# Patient Record
Sex: MALE | Race: WHITE | NOT HISPANIC OR LATINO
[De-identification: names, ages, dates, MRNs, and addresses within clinical notes are randomized per-mention and may not be internally consistent; named-entity substitution may affect disease eponyms.]

---

## 2019-09-05 PROBLEM — Z00.00 ENCOUNTER FOR PREVENTIVE HEALTH EXAMINATION: Status: ACTIVE | Noted: 2019-09-05

## 2019-10-01 ENCOUNTER — APPOINTMENT (OUTPATIENT)
Dept: INTERNAL MEDICINE | Facility: CLINIC | Age: 44
End: 2019-10-01
Payer: COMMERCIAL

## 2019-10-01 ENCOUNTER — NON-APPOINTMENT (OUTPATIENT)
Age: 44
End: 2019-10-01

## 2019-10-01 VITALS
HEART RATE: 82 BPM | DIASTOLIC BLOOD PRESSURE: 88 MMHG | WEIGHT: 172 LBS | HEIGHT: 74 IN | SYSTOLIC BLOOD PRESSURE: 128 MMHG | BODY MASS INDEX: 22.07 KG/M2 | TEMPERATURE: 99.5 F | OXYGEN SATURATION: 98 %

## 2019-10-01 DIAGNOSIS — Z91.5 PERSONAL HISTORY OF SELF-HARM: ICD-10-CM

## 2019-10-01 DIAGNOSIS — Z83.79 FAMILY HISTORY OF OTHER DISEASES OF THE DIGESTIVE SYSTEM: ICD-10-CM

## 2019-10-01 DIAGNOSIS — Z11.59 ENCOUNTER FOR SCREENING FOR OTHER VIRAL DISEASES: ICD-10-CM

## 2019-10-01 DIAGNOSIS — R09.82 POSTNASAL DRIP: ICD-10-CM

## 2019-10-01 DIAGNOSIS — Z23 ENCOUNTER FOR IMMUNIZATION: ICD-10-CM

## 2019-10-01 PROCEDURE — 90471 IMMUNIZATION ADMIN: CPT

## 2019-10-01 PROCEDURE — 99205 OFFICE O/P NEW HI 60 MIN: CPT | Mod: 25

## 2019-10-01 PROCEDURE — 90472 IMMUNIZATION ADMIN EACH ADD: CPT

## 2019-10-01 PROCEDURE — 90686 IIV4 VACC NO PRSV 0.5 ML IM: CPT

## 2019-10-01 PROCEDURE — 93000 ELECTROCARDIOGRAM COMPLETE: CPT

## 2019-10-01 PROCEDURE — 36415 COLL VENOUS BLD VENIPUNCTURE: CPT

## 2019-10-01 PROCEDURE — 90732 PPSV23 VACC 2 YRS+ SUBQ/IM: CPT

## 2019-10-01 RX ORDER — ALBUTEROL SULFATE 90 UG/1
108 (90 BASE) AEROSOL, METERED RESPIRATORY (INHALATION)
Qty: 1 | Refills: 2 | Status: ACTIVE | COMMUNITY
Start: 2019-10-01 | End: 1900-01-01

## 2019-10-17 ENCOUNTER — APPOINTMENT (OUTPATIENT)
Dept: PULMONOLOGY | Facility: CLINIC | Age: 44
End: 2019-10-17
Payer: COMMERCIAL

## 2019-10-17 VITALS
DIASTOLIC BLOOD PRESSURE: 72 MMHG | BODY MASS INDEX: 22.2 KG/M2 | HEIGHT: 74 IN | OXYGEN SATURATION: 95 % | WEIGHT: 173 LBS | TEMPERATURE: 97.8 F | HEART RATE: 86 BPM | SYSTOLIC BLOOD PRESSURE: 126 MMHG

## 2019-10-17 DIAGNOSIS — Z87.898 PERSONAL HISTORY OF OTHER SPECIFIED CONDITIONS: ICD-10-CM

## 2019-10-17 DIAGNOSIS — Z87.09 PERSONAL HISTORY OF OTHER DISEASES OF THE RESPIRATORY SYSTEM: ICD-10-CM

## 2019-10-17 DIAGNOSIS — Z77.098 CONTACT WITH AND (SUSPECTED) EXPOSURE TO OTHER HAZARDOUS, CHIEFLY NONMEDICINAL, CHEMICALS: ICD-10-CM

## 2019-10-17 PROCEDURE — 94060 EVALUATION OF WHEEZING: CPT

## 2019-10-17 PROCEDURE — 94727 GAS DIL/WSHOT DETER LNG VOL: CPT

## 2019-10-17 PROCEDURE — 99204 OFFICE O/P NEW MOD 45 MIN: CPT | Mod: 25

## 2019-10-17 PROCEDURE — 94729 DIFFUSING CAPACITY: CPT

## 2019-10-17 NOTE — REVIEW OF SYSTEMS
[Cough] : cough [Dyspnea] : dyspnea [Chest Tightness] : chest tightness [Wheezing] : wheezing [Depression] : depression [Negative] : Sleep Disorder [Sputum] : not coughing up ~M sputum [Hay Fever] : no hay fever [Itchy Eyes] : no itching of ~T the eyes [Hives] : no urticaria was observed

## 2019-10-17 NOTE — PHYSICAL EXAM
[General Appearance - Well Developed] : well developed [General Appearance - Well Nourished] : well nourished [Normal Conjunctiva] : the conjunctiva exhibited no abnormalities [Normal Oropharynx] : normal oropharynx [Neck Appearance] : the appearance of the neck was normal [Thyroid Diffuse Enlargement] : the thyroid was not enlarged [Heart Sounds] : normal S1 and S2 [Arterial Pulses Normal] : the arterial pulses were normal [Bowel Sounds] : normal bowel sounds [Abdomen Tenderness] : non-tender [] : no hepato-splenomegaly [Abnormal Walk] : normal gait [Nail Clubbing] : no clubbing of the fingernails [Cyanosis, Localized] : no localized cyanosis [1+ Pitting] : 1+  pitting [FreeTextEntry1] : Diffuse bilateral wheezing present [Deep Tendon Reflexes (DTR)] : deep tendon reflexes were 2+ and symmetric [Sensation] : the sensory exam was normal to light touch and pinprick [No Focal Deficits] : no focal deficits [Oriented To Time, Place, And Person] : oriented to person, place, and time [Impaired Insight] : insight and judgment were intact [Affect] : the affect was normal

## 2019-10-17 NOTE — ASSESSMENT
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 also possibility of hypersensitivity pneumonitis given exposure of multiple pets. patient's symptom chronology doesn't fit with COPD. \par \par PLAN: \par - start prednisone 20 mg x 14 days.\par - cont symbicort 160 mg 2 puffs twice a day, singulair 10 mg Qday\par - CT chest  (inspiratory and expiratory) to eval for mucous plugs/endobronchial lesions. \par - re-assess in 2 weeks. \par \par The patient was s/e/d with Dr Jackson.

## 2019-10-17 NOTE — HISTORY OF PRESENT ILLNESS
[FreeTextEntry1] : The patient is a 44 yr old male with\par \par breathing trouble for 4 years now. \par symptoms: difficulty breathing/wheezing. \par he hasn't been formally diagnosed with asthma. no hx of childhood asthma.  no family hx of asthma. 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 He had atleast 2 accidents with chemical spills (chlorine, diamtomacous earth (for filteration system).   \par \par About 8 months after swimming pool incident (4 yrs ago), had an acute exacerbation and was seen in urgent care and then hospitalized.\par  He was also in ICU in [facility] and [facility], was never intubated. \par no triggers identified.  difficult to lie to left side. \par last hospital admission was triggered by cold/viral infection. \par \par hx of alcoholism in 20s (24- 32 yrs age  about 3 drinks average daily). \par Smoked cig x 6 months (20 yrs ago)\par No vaping/smoking now.  \par \par also had a Hx of acute pancreatitis. \par \par currently using symbicort BID (2 months), ventolin 3 times daily.

## 2019-10-18 ENCOUNTER — RX RENEWAL (OUTPATIENT)
Age: 44
End: 2019-10-18

## 2019-10-18 RX ORDER — BUDESONIDE AND FORMOTEROL FUMARATE DIHYDRATE 160; 4.5 UG/1; UG/1
160-4.5 AEROSOL RESPIRATORY (INHALATION) TWICE DAILY
Qty: 1 | Refills: 0 | Status: DISCONTINUED | COMMUNITY
Start: 2019-10-17 | End: 2019-10-18

## 2019-10-19 LAB — TOTAL IGE SMQN RAST: 427 KU/L

## 2019-10-21 LAB
A FLAVUS AB FLD QL: NEGATIVE
A FUMIGATUS AB FLD QL: NEGATIVE
A NIGER AB FLD QL: NEGATIVE

## 2019-10-27 LAB
ASPERGILLUS FLAVUS PRECIPITINS: NEGATIVE
ASPERGILLUS FUMIGATES PRECIPTINS: NEGATIVE
ASPERGILLUS NIGER PRECIPITINS: NEGATIVE

## 2019-10-29 ENCOUNTER — INBOUND DOCUMENT (OUTPATIENT)
Age: 44
End: 2019-10-29

## 2019-10-29 ENCOUNTER — RX RENEWAL (OUTPATIENT)
Age: 44
End: 2019-10-29

## 2019-10-29 LAB
25(OH)D3 SERPL-MCNC: 19.5 NG/ML
A1AT SERPL-MCNC: 128 MG/DL
ALBUMIN SERPL ELPH-MCNC: 5.2 G/DL
ALP BLD-CCNC: 86 U/L
ALT SERPL-CCNC: 95 U/L
AMYLASE/CREAT SERPL: 79 U/L
ANION GAP SERPL CALC-SCNC: 14 MMOL/L
APPEARANCE: CLEAR
AST SERPL-CCNC: 61 U/L
BACTERIA: NEGATIVE
BASOPHILS # BLD AUTO: 0.03 K/UL
BASOPHILS NFR BLD AUTO: 0.5 %
BILIRUB SERPL-MCNC: 0.7 MG/DL
BILIRUBIN URINE: NEGATIVE
BLOOD URINE: NEGATIVE
BUN SERPL-MCNC: 15 MG/DL
CALCIUM SERPL-MCNC: 10.2 MG/DL
CHLORIDE SERPL-SCNC: 101 MMOL/L
CHOLEST SERPL-MCNC: 175 MG/DL
CHOLEST/HDLC SERPL: 3.1 RATIO
CO2 SERPL-SCNC: 27 MMOL/L
COLOR: YELLOW
CREAT SERPL-MCNC: 0.76 MG/DL
CREAT SPEC-SCNC: 180 MG/DL
EOSINOPHIL # BLD AUTO: 0.33 K/UL
EOSINOPHIL NFR BLD AUTO: 5.6 %
ESTIMATED AVERAGE GLUCOSE: 114 MG/DL
FOLATE SERPL-MCNC: 10 NG/ML
GLUCOSE QUALITATIVE U: NEGATIVE
GLUCOSE SERPL-MCNC: 127 MG/DL
HBA1C MFR BLD HPLC: 5.6 %
HCT VFR BLD CALC: 48.3 %
HDLC SERPL-MCNC: 56 MG/DL
HGB BLD-MCNC: 14.7 G/DL
HYALINE CASTS: 0 /LPF
IMM GRANULOCYTES NFR BLD AUTO: 0.2 %
KETONES URINE: NEGATIVE
LDLC SERPL CALC-MCNC: 105 MG/DL
LEUKOCYTE ESTERASE URINE: NEGATIVE
LPL SERPL-CCNC: 11 U/L
LYMPHOCYTES # BLD AUTO: 1.76 K/UL
LYMPHOCYTES NFR BLD AUTO: 29.8 %
MAN DIFF?: NORMAL
MCHC RBC-ENTMCNC: 29.2 PG
MCHC RBC-ENTMCNC: 30.4 GM/DL
MCV RBC AUTO: 96 FL
MEV IGG FLD QL IA: >300 AU/ML
MEV IGG+IGM SER-IMP: POSITIVE
MICROALBUMIN 24H UR DL<=1MG/L-MCNC: <1.2 MG/DL
MICROALBUMIN/CREAT 24H UR-RTO: NORMAL MG/G
MICROSCOPIC-UA: NORMAL
MONOCYTES # BLD AUTO: 0.7 K/UL
MONOCYTES NFR BLD AUTO: 11.8 %
MUV AB SER-ACNC: POSITIVE
MUV IGG SER QL IA: 11.1 AU/ML
NEUTROPHILS # BLD AUTO: 3.08 K/UL
NEUTROPHILS NFR BLD AUTO: 52.1 %
NITRITE URINE: NEGATIVE
PH URINE: 5
PLATELET # BLD AUTO: 190 K/UL
POTASSIUM SERPL-SCNC: 4.3 MMOL/L
PROT SERPL-MCNC: 7.4 G/DL
PROTEIN URINE: NEGATIVE
RBC # BLD: 5.03 M/UL
RBC # FLD: 13.8 %
RED BLOOD CELLS URINE: 1 /HPF
RUBV IGG FLD-ACNC: 5.4 INDEX
RUBV IGG SER-IMP: POSITIVE
SODIUM SERPL-SCNC: 142 MMOL/L
SPECIFIC GRAVITY URINE: 1.02
SQUAMOUS EPITHELIAL CELLS: 0 /HPF
TRIGL SERPL-MCNC: 70 MG/DL
TSH SERPL-ACNC: 1.64 UIU/ML
UROBILINOGEN URINE: NORMAL
VIT B12 SERPL-MCNC: 601 PG/ML
VZV AB TITR SER: POSITIVE
VZV IGG SER IF-ACNC: 691.7 INDEX
WBC # FLD AUTO: 5.91 K/UL
WHITE BLOOD CELLS URINE: 2 /HPF

## 2019-10-31 ENCOUNTER — APPOINTMENT (OUTPATIENT)
Dept: PULMONOLOGY | Facility: CLINIC | Age: 44
End: 2019-10-31
Payer: COMMERCIAL

## 2019-10-31 VITALS
BODY MASS INDEX: 22.46 KG/M2 | DIASTOLIC BLOOD PRESSURE: 70 MMHG | HEART RATE: 106 BPM | WEIGHT: 175 LBS | SYSTOLIC BLOOD PRESSURE: 118 MMHG | OXYGEN SATURATION: 96 % | HEIGHT: 74 IN | TEMPERATURE: 98.9 F

## 2019-10-31 PROCEDURE — 99214 OFFICE O/P EST MOD 30 MIN: CPT

## 2019-10-31 RX ORDER — FLUTICASONE FUROATE AND VILANTEROL TRIFENATATE 200; 25 UG/1; UG/1
200-25 POWDER RESPIRATORY (INHALATION) DAILY
Qty: 1 | Refills: 11 | Status: DISCONTINUED | COMMUNITY
Start: 2019-10-18 | End: 2019-10-31

## 2019-11-06 ENCOUNTER — TRANSCRIPTION ENCOUNTER (OUTPATIENT)
Age: 44
End: 2019-11-06

## 2019-11-09 RX ORDER — OMALIZUMAB 202.5 MG/1.4ML
150 INJECTION, SOLUTION SUBCUTANEOUS
Qty: 1 | Refills: 11 | Status: DISCONTINUED | COMMUNITY
Start: 2019-10-19 | End: 2019-11-09

## 2019-11-12 ENCOUNTER — RX RENEWAL (OUTPATIENT)
Age: 44
End: 2019-11-12

## 2019-11-13 ENCOUNTER — FORM ENCOUNTER (OUTPATIENT)
Age: 44
End: 2019-11-13

## 2019-11-14 ENCOUNTER — APPOINTMENT (OUTPATIENT)
Dept: CT IMAGING | Facility: HOSPITAL | Age: 44
End: 2019-11-14
Payer: COMMERCIAL

## 2019-11-14 ENCOUNTER — LABORATORY RESULT (OUTPATIENT)
Age: 44
End: 2019-11-14

## 2019-11-14 ENCOUNTER — APPOINTMENT (OUTPATIENT)
Dept: ULTRASOUND IMAGING | Facility: HOSPITAL | Age: 44
End: 2019-11-14
Payer: COMMERCIAL

## 2019-11-14 ENCOUNTER — APPOINTMENT (OUTPATIENT)
Dept: PULMONOLOGY | Facility: CLINIC | Age: 44
End: 2019-11-14
Payer: COMMERCIAL

## 2019-11-14 ENCOUNTER — OUTPATIENT (OUTPATIENT)
Dept: OUTPATIENT SERVICES | Facility: HOSPITAL | Age: 44
LOS: 1 days | End: 2019-11-14
Payer: COMMERCIAL

## 2019-11-14 PROCEDURE — 76700 US EXAM ABDOM COMPLETE: CPT

## 2019-11-14 PROCEDURE — 71250 CT THORAX DX C-: CPT | Mod: 26

## 2019-11-14 PROCEDURE — 36415 COLL VENOUS BLD VENIPUNCTURE: CPT

## 2019-11-14 PROCEDURE — 76700 US EXAM ABDOM COMPLETE: CPT | Mod: 26

## 2019-11-14 PROCEDURE — 71250 CT THORAX DX C-: CPT

## 2019-11-18 ENCOUNTER — APPOINTMENT (OUTPATIENT)
Dept: INTERNAL MEDICINE | Facility: CLINIC | Age: 44
End: 2019-11-18
Payer: COMMERCIAL

## 2019-11-18 ENCOUNTER — APPOINTMENT (OUTPATIENT)
Dept: UROLOGY | Facility: CLINIC | Age: 44
End: 2019-11-18
Payer: COMMERCIAL

## 2019-11-18 VITALS
BODY MASS INDEX: 22.84 KG/M2 | HEART RATE: 98 BPM | TEMPERATURE: 99.2 F | DIASTOLIC BLOOD PRESSURE: 79 MMHG | WEIGHT: 178 LBS | SYSTOLIC BLOOD PRESSURE: 119 MMHG | OXYGEN SATURATION: 98 % | HEIGHT: 74 IN

## 2019-11-18 DIAGNOSIS — Z87.19 PERSONAL HISTORY OF OTHER DISEASES OF THE DIGESTIVE SYSTEM: ICD-10-CM

## 2019-11-18 DIAGNOSIS — R39.9 UNSPECIFIED SYMPTOMS AND SIGNS INVOLVING THE GENITOURINARY SYSTEM: ICD-10-CM

## 2019-11-18 DIAGNOSIS — R30.0 DYSURIA: ICD-10-CM

## 2019-11-18 DIAGNOSIS — Z78.9 OTHER SPECIFIED HEALTH STATUS: ICD-10-CM

## 2019-11-18 DIAGNOSIS — K59.09 OTHER CONSTIPATION: ICD-10-CM

## 2019-11-18 LAB
BILIRUB UR QL STRIP: NORMAL
CFTR MUT ANL BLD/T: NORMAL
CLARITY UR: NORMAL
COLLECTION METHOD: NORMAL
GLUCOSE UR-MCNC: NORMAL
HCG UR QL: 0.2 EU/DL
HGB UR QL STRIP.AUTO: NORMAL
KETONES UR-MCNC: NORMAL
LEUKOCYTE ESTERASE UR QL STRIP: NORMAL
NITRITE UR QL STRIP: NORMAL
PH UR STRIP: 5
PROT UR STRIP-MCNC: NORMAL
SP GR UR STRIP: 1.03

## 2019-11-18 PROCEDURE — 81003 URINALYSIS AUTO W/O SCOPE: CPT | Mod: QW

## 2019-11-18 PROCEDURE — 99202 OFFICE O/P NEW SF 15 MIN: CPT | Mod: 25

## 2019-11-18 PROCEDURE — 99214 OFFICE O/P EST MOD 30 MIN: CPT

## 2019-11-18 PROCEDURE — 51741 ELECTRO-UROFLOWMETRY FIRST: CPT

## 2019-11-18 PROCEDURE — 51798 US URINE CAPACITY MEASURE: CPT | Mod: 59

## 2019-11-18 RX ORDER — PREDNISONE 5 MG/1
5 TABLET ORAL
Qty: 9 | Refills: 0 | Status: DISCONTINUED | COMMUNITY
Start: 2019-10-31 | End: 2019-11-18

## 2019-11-18 RX ORDER — ALBUTEROL 90 MCG
90 AEROSOL (GRAM) INHALATION
Refills: 0 | Status: COMPLETED | COMMUNITY

## 2019-11-18 RX ORDER — PREDNISONE 20 MG/1
20 TABLET ORAL
Qty: 14 | Refills: 0 | Status: DISCONTINUED | COMMUNITY
Start: 2019-10-17 | End: 2019-11-18

## 2019-11-20 PROBLEM — Z87.19 HISTORY OF ACUTE PANCREATITIS: Status: RESOLVED | Noted: 2019-10-01 | Resolved: 2019-11-20

## 2019-12-02 ENCOUNTER — APPOINTMENT (OUTPATIENT)
Dept: UROLOGY | Facility: CLINIC | Age: 44
End: 2019-12-02

## 2019-12-10 NOTE — ASSESSMENT
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 will taper over next 1 week.\par - considering steroid dependent nature and severe persistent asthma with elevated IgE, patient will benefit from Dupixent if approved by insurance and decrease hospitalization frequency.. will contact patient once it is approved.  \par - cont high dose fluticasone/salmetrol combo in the meanwhile. \par \par The patient was s/e/d with Dr Jackson.

## 2019-12-10 NOTE — PHYSICAL EXAM
[General Appearance - Well Developed] : well developed [General Appearance - Well Nourished] : well nourished [Normal Conjunctiva] : the conjunctiva exhibited no abnormalities [Normal Oropharynx] : normal oropharynx [Neck Appearance] : the appearance of the neck was normal [Thyroid Diffuse Enlargement] : the thyroid was not enlarged [Heart Sounds] : normal S1 and S2 [Arterial Pulses Normal] : the arterial pulses were normal [Bowel Sounds] : normal bowel sounds [Abdomen Tenderness] : non-tender [] : no hepato-splenomegaly [Abnormal Walk] : normal gait [Nail Clubbing] : no clubbing of the fingernails [Cyanosis, Localized] : no localized cyanosis [1+ Pitting] : 1+  pitting [Deep Tendon Reflexes (DTR)] : deep tendon reflexes were 2+ and symmetric [Sensation] : the sensory exam was normal to light touch and pinprick [No Focal Deficits] : no focal deficits [Oriented To Time, Place, And Person] : oriented to person, place, and time [Impaired Insight] : insight and judgment were intact [Affect] : the affect was normal [FreeTextEntry1] : Diffuse bilateral wheezing present

## 2019-12-10 NOTE — HISTORY OF PRESENT ILLNESS
[1  - Very slight] : 1, very slight [Class II - Mild Symptoms and Slight Limitations] : II [Former] : is a former smoker [None] : None [Adherent] : the patient is adherent with ~his/her~ medication regimen [Difficulty Breathing During Exertion] : improved dyspnea on exertion [Feelings Of Weakness On Exertion] : improved exercise intolerance [Cough] : improved coughing [Wheezing] : denies wheezing [Regional Soft Tissue Swelling Both Lower Extremities] : denies lower extremity edema [Chest Pain Or Discomfort] : denies chest pain [Fever] : denies fever [Wt Gain ___ Lbs] : no recent weight gain [Oxygen] : the patient uses no supplemental oxygen [More Frequent Use Needed Recently] : Patient reports no recent increase in frequency of [Good Control] : peak flow has been poor [Side Effects] : ~He/She~ denies medication side effects [FreeTextEntry1] : The patient is a 44 yr old male with\par \par breathing trouble for 4 years now. \par symptoms: difficulty breathing/wheezing. \par he hasn't been formally diagnosed with asthma. no hx of childhood asthma.  no family hx of asthma. 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 He had atleast 2 accidents with chemical spills (chlorine, diamtomacous earth (for filteration system).   \par \par About 8 months after swimming pool incident (4 yrs ago), had an acute exacerbation and was seen in urgent care and then hospitalized.\par  He was also in ICU in [facility], was never intubated. \par no triggers identified.  difficult to lie to left side. \par last hospital admission was triggered by cold/viral infection. \par \par hx of alcoholism in 20s (24- 32 yrs age  about 3 drinks average daily). \par Smoked cig x 6 months (20 yrs ago)\par No vaping/smoking now.  \par \par also had a Hx of acute pancreatitis. \par currently using symbicort BID (2 months), ventolin 3 times daily.\par \par 10/31/2019: \par \par Since starting prednisone 20 mg 2 weeks ago, patient is feeling much better, breathing is easier now on wixela 500/50 one puff twice a day.

## 2020-03-17 ENCOUNTER — TRANSCRIPTION ENCOUNTER (OUTPATIENT)
Age: 45
End: 2020-03-17

## 2020-03-19 ENCOUNTER — TRANSCRIPTION ENCOUNTER (OUTPATIENT)
Age: 45
End: 2020-03-19

## 2020-05-13 ENCOUNTER — APPOINTMENT (OUTPATIENT)
Dept: INTERNAL MEDICINE | Facility: CLINIC | Age: 45
End: 2020-05-13
Payer: MEDICAID

## 2020-05-13 DIAGNOSIS — K21.9 GASTRO-ESOPHAGEAL REFLUX DISEASE W/OUT ESOPHAGITIS: ICD-10-CM

## 2020-05-13 PROCEDURE — 99441: CPT

## 2020-05-16 PROBLEM — K21.9 CHRONIC GERD: Status: ACTIVE | Noted: 2019-10-01

## 2020-05-16 NOTE — HISTORY OF PRESENT ILLNESS
[FreeTextEntry8] : When started lansoprazole in the fall, symptoms improved so he didn't schedule with GI.\par \par Having worsening heartburn, but no changes in diet

## 2020-05-18 ENCOUNTER — TRANSCRIPTION ENCOUNTER (OUTPATIENT)
Age: 45
End: 2020-05-18

## 2020-05-18 RX ORDER — LANSOPRAZOLE 30 MG/1
30 CAPSULE, DELAYED RELEASE ORAL DAILY
Qty: 90 | Refills: 1 | Status: COMPLETED | COMMUNITY
Start: 2019-10-01 | End: 2020-05-18

## 2020-05-18 RX ORDER — DEXLANSOPRAZOLE 60 MG/1
60 CAPSULE, DELAYED RELEASE ORAL
Qty: 90 | Refills: 0 | Status: DISCONTINUED | COMMUNITY
Start: 2020-05-13 | End: 2020-05-18

## 2020-09-13 ENCOUNTER — EMERGENCY (EMERGENCY)
Age: 45
LOS: 1 days | Discharge: HOME | End: 2020-09-13
Attending: EMERGENCY MEDICINE | Admitting: EMERGENCY MEDICINE
Payer: COMMERCIAL

## 2020-09-13 VITALS
SYSTOLIC BLOOD PRESSURE: 131 MMHG | OXYGEN SATURATION: 98 % | HEART RATE: 100 BPM | DIASTOLIC BLOOD PRESSURE: 95 MMHG | RESPIRATION RATE: 20 BRPM | TEMPERATURE: 98 F

## 2020-09-13 VITALS
WEIGHT: 190.04 LBS | RESPIRATION RATE: 20 BRPM | TEMPERATURE: 98 F | OXYGEN SATURATION: 99 % | HEART RATE: 122 BPM | SYSTOLIC BLOOD PRESSURE: 125 MMHG | DIASTOLIC BLOOD PRESSURE: 93 MMHG

## 2020-09-13 DIAGNOSIS — Z20.828 CONTACT WITH AND (SUSPECTED) EXPOSURE TO OTHER VIRAL COMMUNICABLE DISEASES: ICD-10-CM

## 2020-09-13 DIAGNOSIS — R06.02 SHORTNESS OF BREATH: ICD-10-CM

## 2020-09-13 DIAGNOSIS — R04.0 EPISTAXIS: ICD-10-CM

## 2020-09-13 DIAGNOSIS — R53.1 WEAKNESS: ICD-10-CM

## 2020-09-13 DIAGNOSIS — K85.90 ACUTE PANCREATITIS WITHOUT NECROSIS OR INFECTION, UNSPECIFIED: ICD-10-CM

## 2020-09-13 LAB — LACTATE SERPL-SCNC: 1.9 MMOL/L — SIGNIFICANT CHANGE UP (ref 0.5–2)

## 2020-09-13 PROCEDURE — 71046 X-RAY EXAM CHEST 2 VIEWS: CPT | Mod: 26

## 2020-09-13 PROCEDURE — 99285 EMERGENCY DEPT VISIT HI MDM: CPT

## 2020-09-13 RX ORDER — SODIUM CHLORIDE 9 MG/ML
1000 INJECTION INTRAMUSCULAR; INTRAVENOUS; SUBCUTANEOUS ONCE
Refills: 0 | Status: COMPLETED | OUTPATIENT
Start: 2020-09-13 | End: 2020-09-13

## 2020-09-13 RX ORDER — FAMOTIDINE 10 MG/ML
20 INJECTION INTRAVENOUS ONCE
Refills: 0 | Status: COMPLETED | OUTPATIENT
Start: 2020-09-13 | End: 2020-09-13

## 2020-09-13 RX ORDER — SODIUM CHLORIDE 9 MG/ML
1000 INJECTION, SOLUTION INTRAVENOUS ONCE
Refills: 0 | Status: COMPLETED | OUTPATIENT
Start: 2020-09-13 | End: 2020-09-13

## 2020-09-13 RX ORDER — HYDROMORPHONE HYDROCHLORIDE 2 MG/ML
1 INJECTION INTRAMUSCULAR; INTRAVENOUS; SUBCUTANEOUS ONCE
Refills: 0 | Status: DISCONTINUED | OUTPATIENT
Start: 2020-09-13 | End: 2020-09-13

## 2020-09-13 NOTE — ED PROVIDER NOTE - NSFOLLOWUPINSTRUCTIONS_ED_ALL_ED_FT
eat bland diet, avoid spicy / fried foods, avoid alcohol, take pepcid, follow up with gi doctor  [place] Diet  A bland diet consists of foods that are often soft and do not have a lot of fat, fiber, or extra seasonings. Foods without fat, fiber, or seasoning are easier for the body to digest. They are also less likely to irritate your mouth, throat, stomach, and other parts of your digestive system. A bland diet is sometimes called a BRAT diet.  What is my plan?  Your health care provider or food and nutrition specialist (dietitian) may recommend specific changes to your diet to prevent symptoms or to treat your symptoms. These changes may include:  •Eating small meals often.  •Cooking food until it is soft enough to chew easily.  •Chewing your food well.  •Drinking fluids slowly.  •Not eating foods that are very spicy, sour, or fatty.  •Not eating citrus fruits, such as oranges and grapefruit.  What do I need to know about this diet?  •Eat a variety of foods from the bland diet food list.  • Do not follow a bland diet longer than needed.  •Ask your health care provider whether you should take vitamins or supplements.  What foods can I eat?  Grains   Hot cereals, such as cream of wheat. Rice. Bread, crackers, or tortillas made from refined white flour.  Vegetables   Canned or cooked vegetables. Mashed or boiled potatoes.  Fruits   Bananas. Applesauce. Other types of cooked or canned fruit with the skin and seeds removed, such as canned peaches or pears.  Meats and other proteins   Scrambled eggs. Creamy peanut butter or other nut butters. Lean, well-cooked meats, such as chicken or fish. Tofu. Soups or broths.  Dairy   Low-fat dairy products, such as milk, cottage cheese, or yogurt.  Beverages   Water. Herbal tea. Apple juice.  Fats and oils   Mild salad dressings. Canola or olive oil.  Sweets and desserts   Pudding. Custard. Fruit gelatin. Ice cream.  The items listed above may not be a complete list of recommended foods and beverages. Contact a dietitian for more options.   What foods are not recommended?  Grains   Whole grain breads and cereals.  Vegetables   Raw vegetables.  Fruits   Raw fruits, especially citrus, berries, or dried fruits.  Dairy   Whole fat dairy foods.  Beverages   Caffeinated drinks. Alcohol.  Seasonings and condiments   Strongly flavored seasonings or condiments. Hot sauce. Salsa.  Other foods   Spicy foods. Fried foods. Sour foods, such as pickled or fermented foods. Foods with high sugar content. Foods high in fiber.  The items listed above may not be a complete list of foods and beverages to avoid. Contact a dietitian for more information.   Summary  •A bland diet consists of foods that are often soft and do not have a lot of fat, fiber, or extra seasonings.  •Foods without fat, fiber, or seasoning are easier for the body to digest.  •Check with your health care provider to see how long you should follow this diet plan. It is not meant to be followed for long periods.

## 2020-09-13 NOTE — ED PROVIDER NOTE - PATIENT PORTAL LINK FT
You can access the FollowMyHealth Patient Portal offered by [facility] by registering at the following website: [email or web address]. By joining [employer]’s FollowMyHealth portal, you will also be able to view your health information using other applications (apps) compatible with our system.

## 2020-09-13 NOTE — ED ADULT NURSE NOTE - CHPI ED NUR SYMPTOMS NEG
no dysuria/no burning urination/no chills/no blood in stool/no fever/no hematuria/no abdominal distension/no vomiting

## 2020-09-13 NOTE — ED PROVIDER NOTE - SHIFT CHANGE DETAILS
45M recurrent pancreatitis from reportedly ?reason, "dilaudid withdrawals," c/o epigastric abd pain. labs wnl. s/p dilaudid. stable.    dispo: pending ct, po challenge, anticipate dc home 45M prior acute pancreatitis from reportedly ?reason, "dilaudid withdrawals," c/o epigastric abd pain. labs wnl, including lipase. s/p ativan/dilaudid. stable.    dispo: pending ct, po challenge, anticipate dc home

## 2020-09-13 NOTE — ED ADULT TRIAGE NOTE - CHIEF COMPLAINT QUOTE
pt BIBA from [facility] with upper abdominal pain , weakness, pale and diaphoretic , tachycardic , hx pancreatitis /anemia

## 2020-09-13 NOTE — ED ADULT NURSE NOTE - CHIEF COMPLAINT QUOTE
pt BIBA from [facility] with upper abdominal pain , weakness, pale and diaphoretic , tachycardic , hx pancreatitis /anemia

## 2020-09-13 NOTE — ED PROVIDER NOTE - PHYSICAL EXAMINATION

## 2020-09-13 NOTE — ED PROVIDER NOTE - OBJECTIVE STATEMENT
44 yo hx of pancreatitis, unspecified lung allergic reaction presents with abd pain, shortness of breath for two days, states does not know what triggered it this time. no recent etoh/fat food intake. has had withdrawals from dilaudid in the past. admissions to [facility] in the past. 46 yo hx of pancreatitis w 9 prior admissions at OSH, most at [facility], presents with abd pain, shortness of breath for two days, states does not know what triggered epi pain this time. no recent etoh/fat food intake. has had withdrawals from dilaudid in the past. Denies associated NVD, lightheaded,  palpitations, cough/rhinorrhea, black/bloody stool, LE pain/swelling, focal weakness/numbness, urinary complaints, f/c. No SI/HI currently although states prior lomeli

## 2020-09-13 NOTE — ED ADULT NURSE NOTE - NS ED NURSE LEVEL OF CONSCIOUSNESS MENTAL STATUS
-- DO NOT REPLY / DO NOT REPLY ALL --  -- Message is from the [facility]--    COVID-19 Universal Screening: N/A - Not about scheduling    General Patient Message      Reason for Call: Patient had video call with neurosurgeon and stated that he would like the patient to take mobic or stellabec, and the patient noted his elevated creatine level and voltaren for it and the neurosurgeon stated that it could be absorbed into his system. The patient would like to discuss this with Dr. Mahoney.     Caller Information       Type Contact Phone    08/24/2020 11:25 AM CDT Phone (Incoming) Teddy Kamara (Self) [phone] (M)          Alternative phone number: none    Turnaround time given to caller:   \"This message will be sent to [state Provider's name]. The clinical team will fulfill your request as soon as they review your message.\"    
Alert/Awake/Cooperative

## 2020-09-13 NOTE — ED ADULT NURSE NOTE - OBJECTIVE STATEMENT
46 yo m pmhx pancreatitis, depression presents to the ED co weakness, nausea, heart palpitations, 2 episodes of diarrhea and diaphoresis since yesterday. Pt denies use of medication for pain relief/ nausea. Upon arrival pt AOx3, speaking clearly and coherently in full sentences. Pt describes baseline 4/10 pain, and epigastric area and bilateral upper quadrants are tender to palpation. Pt noted to be diaphoretic, EKG completed and is afebrile. Last able to tolerate PO protein shake this AM followed by discomfort. Denies fevers, chills, headache, dizziness, vomiting. Denies use of alcohol.

## 2020-09-13 NOTE — ED ADULT NURSE REASSESSMENT NOTE - NS ED NURSE REASSESS COMMENT FT1
Pt resting in stretcher, more comfortable appearing than upon arrival. Pt reports 4/10 pain at rest and increased pain with movement.

## 2020-09-13 NOTE — ED PROVIDER NOTE - PROGRESS NOTE DETAILS
ED ct down. still awaiting ct upstairs. ct tech states pt should go next. pt stable. conchis: pt received at sign out from dr alicea as pending ct abd to r/o pancreatitis, well appearing, nad, requesting dilaudid repeatedly but nad, behavior highly concerning for drug seeking, no further pain meds given, ct neg, lipase neg, bland diet and gi f/u encouraged - offered gi referral but refused

## 2020-09-13 NOTE — ED PROVIDER NOTE - CLINICAL SUMMARY MEDICAL DECISION MAKING FREE TEXT BOX
Pt w concern for epigastric pain Pt w concern for epigastric pain and acute pancreatitis, plan for labs, CT given last pancreatitis flare three years ago. LR bolus, pt somewhat anxious- ativan given. Likely admission for pain control, bowel rest and cont'd hydration. PMD Fallon Garcia, no GI follow up.

## 2020-09-14 LAB — SARS-COV-2 RNA SPEC QL NAA+PROBE: SIGNIFICANT CHANGE UP

## 2020-09-14 PROCEDURE — 96374 THER/PROPH/DIAG INJ IV PUSH: CPT | Mod: XU

## 2020-09-14 PROCEDURE — 83605 ASSAY OF LACTIC ACID: CPT

## 2020-09-14 PROCEDURE — 74177 CT ABD & PELVIS W/CONTRAST: CPT

## 2020-09-14 PROCEDURE — 86850 RBC ANTIBODY SCREEN: CPT

## 2020-09-14 PROCEDURE — 86901 BLOOD TYPING SEROLOGIC RH(D): CPT

## 2020-09-14 PROCEDURE — 71046 X-RAY EXAM CHEST 2 VIEWS: CPT

## 2020-09-14 PROCEDURE — 86900 BLOOD TYPING SEROLOGIC ABO: CPT

## 2020-09-14 PROCEDURE — 96361 HYDRATE IV INFUSION ADD-ON: CPT

## 2020-09-14 PROCEDURE — 83690 ASSAY OF LIPASE: CPT

## 2020-09-14 PROCEDURE — 74177 CT ABD & PELVIS W/CONTRAST: CPT | Mod: 26

## 2020-09-14 PROCEDURE — 85730 THROMBOPLASTIN TIME PARTIAL: CPT

## 2020-09-14 PROCEDURE — U0003: CPT

## 2020-09-14 PROCEDURE — 85025 COMPLETE CBC W/AUTO DIFF WBC: CPT

## 2020-09-14 PROCEDURE — 80053 COMPREHEN METABOLIC PANEL: CPT

## 2020-09-14 PROCEDURE — 36415 COLL VENOUS BLD VENIPUNCTURE: CPT

## 2020-09-14 PROCEDURE — 99284 EMERGENCY DEPT VISIT MOD MDM: CPT | Mod: 25

## 2020-09-14 PROCEDURE — 96375 TX/PRO/DX INJ NEW DRUG ADDON: CPT

## 2020-09-14 PROCEDURE — 85610 PROTHROMBIN TIME: CPT

## 2020-10-09 ENCOUNTER — APPOINTMENT (OUTPATIENT)
Dept: GASTROENTEROLOGY | Facility: CLINIC | Age: 45
End: 2020-10-09

## 2020-10-19 ENCOUNTER — RX RENEWAL (OUTPATIENT)
Age: 45
End: 2020-10-19

## 2020-11-14 ENCOUNTER — RX RENEWAL (OUTPATIENT)
Age: 45
End: 2020-11-14

## 2020-11-17 ENCOUNTER — TRANSCRIPTION ENCOUNTER (OUTPATIENT)
Age: 45
End: 2020-11-17

## 2021-03-08 ENCOUNTER — RX RENEWAL (OUTPATIENT)
Age: 46
End: 2021-03-08

## 2021-04-01 ENCOUNTER — APPOINTMENT (OUTPATIENT)
Dept: INTERNAL MEDICINE | Facility: CLINIC | Age: 46
End: 2021-04-01
Payer: MEDICAID

## 2021-04-01 DIAGNOSIS — R11.10 VOMITING, UNSPECIFIED: ICD-10-CM

## 2021-04-01 PROCEDURE — 99441: CPT

## 2021-04-01 RX ORDER — ONDANSETRON 4 MG/1
4 TABLET, ORALLY DISINTEGRATING ORAL EVERY 6 HOURS
Qty: 28 | Refills: 0 | Status: ACTIVE | COMMUNITY
Start: 2021-04-01 | End: 1900-01-01

## 2021-04-05 PROBLEM — R11.10 VOMITING: Status: ACTIVE | Noted: 2021-04-01

## 2021-05-13 ENCOUNTER — APPOINTMENT (OUTPATIENT)
Dept: PULMONOLOGY | Facility: CLINIC | Age: 46
End: 2021-05-13
Payer: MEDICAID

## 2021-05-13 VITALS
HEART RATE: 90 BPM | TEMPERATURE: 97.8 F | BODY MASS INDEX: 24.77 KG/M2 | SYSTOLIC BLOOD PRESSURE: 147 MMHG | WEIGHT: 193 LBS | HEIGHT: 74 IN | DIASTOLIC BLOOD PRESSURE: 78 MMHG | OXYGEN SATURATION: 97 %

## 2021-05-13 DIAGNOSIS — R91.8 OTHER NONSPECIFIC ABNORMAL FINDING OF LUNG FIELD: ICD-10-CM

## 2021-05-13 PROCEDURE — 99072 ADDL SUPL MATRL&STAF TM PHE: CPT

## 2021-05-13 PROCEDURE — 99214 OFFICE O/P EST MOD 30 MIN: CPT

## 2021-05-13 RX ORDER — SUCRALFATE 1 G/1
1 TABLET ORAL 3 TIMES DAILY
Qty: 30 | Refills: 0 | Status: DISCONTINUED | COMMUNITY
Start: 2020-05-13 | End: 2021-05-13

## 2021-05-13 RX ORDER — FLUTICASONE PROPIONATE 50 UG/1
50 SPRAY, METERED NASAL
Qty: 1 | Refills: 11 | Status: ACTIVE | COMMUNITY
Start: 2019-10-01 | End: 1900-01-01

## 2021-05-13 RX ORDER — MONTELUKAST 10 MG/1
10 TABLET, FILM COATED ORAL
Qty: 90 | Refills: 3 | Status: DISCONTINUED | COMMUNITY
Start: 2019-10-17 | End: 2021-05-13

## 2021-05-13 NOTE — ASSESSMENT
[FreeTextEntry1] : Bronchial asthma\par \par The patient is clinically stable.  The patient since he has been on Dupixent has not used the albuterol at all.  AST score is 25/25.\par \par The patient 1 month missed the Dupixent For 1 month and his condition exacerbated and required short acting beta agonist several times a day.  At this point the patient is to continue on the current regimen.  I will decrease the Advair to 250/51 puff twice a day as the patient is clinically stable.  The patient tolerated discontinuing Singulair.\par \par The patient never required neither emergency room nor urgent care no systemic steroids since the last time I saw him.\par \par Patient will update me on his condition regarding changing his regimen.\par \par Pulmonary nodules\par \par The patient had 5 mm nodule and will repeat the CAT scan to confirm more than 2-year stability

## 2021-05-13 NOTE — HISTORY OF PRESENT ILLNESS
[Never] : never [TextBox_4] : Is doing very well since he was on the biological.  He went to [place] and missed the dose for 1 month and his condition exacerbated and was using the albuterol more than once a day until he started taking it and that now he has not been using the albuterol at all and his exercise capacity is stable.

## 2021-06-03 ENCOUNTER — NON-APPOINTMENT (OUTPATIENT)
Age: 46
End: 2021-06-03

## 2021-07-13 ENCOUNTER — RESULT REVIEW (OUTPATIENT)
Age: 46
End: 2021-07-13

## 2021-07-13 ENCOUNTER — OUTPATIENT (OUTPATIENT)
Dept: OUTPATIENT SERVICES | Facility: HOSPITAL | Age: 46
LOS: 1 days | End: 2021-07-13
Payer: COMMERCIAL

## 2021-07-13 ENCOUNTER — APPOINTMENT (OUTPATIENT)
Dept: CT IMAGING | Facility: HOSPITAL | Age: 46
End: 2021-07-13
Payer: COMMERCIAL

## 2021-07-13 PROCEDURE — 71250 CT THORAX DX C-: CPT | Mod: 26

## 2021-07-13 PROCEDURE — 71250 CT THORAX DX C-: CPT

## 2021-07-27 ENCOUNTER — NON-APPOINTMENT (OUTPATIENT)
Age: 46
End: 2021-07-27

## 2021-09-01 ENCOUNTER — RX RENEWAL (OUTPATIENT)
Age: 46
End: 2021-09-01

## 2021-09-01 ENCOUNTER — TRANSCRIPTION ENCOUNTER (OUTPATIENT)
Age: 46
End: 2021-09-01

## 2021-10-08 ENCOUNTER — APPOINTMENT (OUTPATIENT)
Dept: GASTROENTEROLOGY | Facility: CLINIC | Age: 46
End: 2021-10-08
Payer: MEDICAID

## 2021-10-08 VITALS
HEIGHT: 74 IN | BODY MASS INDEX: 24.38 KG/M2 | DIASTOLIC BLOOD PRESSURE: 80 MMHG | SYSTOLIC BLOOD PRESSURE: 110 MMHG | RESPIRATION RATE: 14 BRPM | TEMPERATURE: 97.2 F | WEIGHT: 190 LBS | HEART RATE: 79 BPM | OXYGEN SATURATION: 98 %

## 2021-10-08 DIAGNOSIS — R19.7 DIARRHEA, UNSPECIFIED: ICD-10-CM

## 2021-10-08 DIAGNOSIS — R10.9 UNSPECIFIED ABDOMINAL PAIN: ICD-10-CM

## 2021-10-08 DIAGNOSIS — K92.0 HEMATEMESIS: ICD-10-CM

## 2021-10-08 PROCEDURE — 99203 OFFICE O/P NEW LOW 30 MIN: CPT

## 2021-10-08 RX ORDER — FLUTICASONE PROPIONATE AND SALMETEROL 500; 50 UG/1; UG/1
500-50 POWDER RESPIRATORY (INHALATION)
Qty: 3 | Refills: 1 | Status: DISCONTINUED | COMMUNITY
Start: 2019-10-29 | End: 2021-10-08

## 2021-10-08 RX ORDER — HYDROXYCHLOROQUINE SULFATE 200 MG/1
200 TABLET, FILM COATED ORAL TWICE DAILY
Qty: 60 | Refills: 0 | Status: DISCONTINUED | COMMUNITY
Start: 2020-03-19 | End: 2021-10-08

## 2021-10-08 RX ORDER — BUPROPION HYDROCHLORIDE 300 MG/1
300 TABLET, EXTENDED RELEASE ORAL DAILY
Qty: 90 | Refills: 1 | Status: DISCONTINUED | COMMUNITY
Start: 2019-10-01 | End: 2021-10-08

## 2021-10-08 RX ORDER — POLYETHYLENE GLYCOL 3350 17 G/17G
17 POWDER, FOR SOLUTION ORAL DAILY
Qty: 1 | Refills: 1 | Status: DISCONTINUED | COMMUNITY
Start: 2019-11-18 | End: 2021-10-08

## 2021-10-08 RX ORDER — ALFUZOSIN HYDROCHLORIDE 10 MG/1
10 TABLET, EXTENDED RELEASE ORAL
Qty: 30 | Refills: 2 | Status: DISCONTINUED | COMMUNITY
Start: 2019-11-18 | End: 2021-10-08

## 2021-10-08 RX ORDER — DUPILUMAB 300 MG/2ML
300 INJECTION, SOLUTION SUBCUTANEOUS
Qty: 1 | Refills: 11 | Status: DISCONTINUED | COMMUNITY
Start: 2019-11-09 | End: 2021-10-08

## 2021-10-08 RX ORDER — POLYETHYLENE GLYCOL 3350 AND ELECTROLYTES WITH LEMON FLAVOR 236; 22.74; 6.74; 5.86; 2.97 G/4L; G/4L; G/4L; G/4L; G/4L
236 POWDER, FOR SOLUTION ORAL
Qty: 1 | Refills: 0 | Status: ACTIVE | COMMUNITY
Start: 2021-10-08 | End: 1900-01-01

## 2021-10-08 RX ORDER — FLUTICASONE PROPIONATE AND SALMETEROL 250; 50 UG/1; UG/1
250-50 POWDER RESPIRATORY (INHALATION) TWICE DAILY
Qty: 1 | Refills: 11 | Status: DISCONTINUED | COMMUNITY
Start: 2021-05-13 | End: 2021-10-08

## 2021-10-08 NOTE — HISTORY OF PRESENT ILLNESS
[FreeTextEntry1] : 45 yo male here for evaluation of chronic pancreatitis, abdominal pain and diarrhea as well as semi-recent episode of hematemesis.  Pt usually has abdominal pain in the lower abdomen.  If pancreas is "acting up" will occur straight in the middle -- worse with fatty foods and EtOH.  Told that he had chronic pancreatitis.  Pt had genetic testing -- cystic fibrosis test that was negative.  + heartburn/reflux -- takes omeprazole 40 mg po bid.  Unbearable sometimes so takes an extra omeprazole in the evening.   Pt was at  at the end of July/ early August with vomiting and hematemesis -- never got a straight answer re results per pt.  Also had an NGT placed.  No transfusions.  Blood count was stable.  Mostly diarrhea and occasional constipation.  No BRBPR, no dark stools.  Weight has been holding around 190 lbs, poor appetite from the pain.  Will drink Ensure drinks.  Work up at [facility] in  -- EUS performed Does not take pancreatic supplements, made no difference.  Also treated for depression at [facility] and [place].  \par \par No hx of a colonoscopy.\par EUS in 2014 -- pancreatic tail mass, benign\par \par No famhx of stomach, colon or pancreatic cancer.   No IBD.\par Sister,  -- chronic pancreatitis\par \par [employer], [facility]  for [employer]\par \par Vaccinated -- Pfizer, second dose end of July

## 2021-10-08 NOTE — ASSESSMENT
[FreeTextEntry1] : 45 yo male here for evaluation of chronic pancreatitis, abdominal pain and diarrhea as well as semi-recent episode of hematemesis.\par \par - EGD and a colonoscopy at [facility]\par - D/w pt regarding need for COVID swab for the procedure as well as escort post-procedure\par - Risks of the procedure including bleeding, perforation, etc d/w the patient\par - Golytely split prep\par - Will check an MRCP\par - F/u in office in 6-8 weeks\par - Will check blood work\par - Check stool elastase

## 2021-10-09 LAB
AMYLASE/CREAT SERPL: 74 U/L
BASOPHILS # BLD AUTO: 0.02 K/UL
BASOPHILS NFR BLD AUTO: 0.3 %
CANCER AG19-9 SERPL-ACNC: 5 U/ML
EOSINOPHIL # BLD AUTO: 0.2 K/UL
EOSINOPHIL NFR BLD AUTO: 3.4 %
HCT VFR BLD CALC: 42.1 %
HGB BLD-MCNC: 13.6 G/DL
IMM GRANULOCYTES NFR BLD AUTO: 0.2 %
INR PPP: 0.99 RATIO
LPL SERPL-CCNC: 11 U/L
LYMPHOCYTES # BLD AUTO: 1.84 K/UL
LYMPHOCYTES NFR BLD AUTO: 31.6 %
MAN DIFF?: NORMAL
MCHC RBC-ENTMCNC: 29.9 PG
MCHC RBC-ENTMCNC: 32.3 GM/DL
MCV RBC AUTO: 92.5 FL
MONOCYTES # BLD AUTO: 0.7 K/UL
MONOCYTES NFR BLD AUTO: 12 %
NEUTROPHILS # BLD AUTO: 3.06 K/UL
NEUTROPHILS NFR BLD AUTO: 52.5 %
PLATELET # BLD AUTO: 149 K/UL
PT BLD: 11.7 SEC
RBC # BLD: 4.55 M/UL
RBC # FLD: 12.6 %
WBC # FLD AUTO: 5.83 K/UL

## 2021-10-11 ENCOUNTER — RX RENEWAL (OUTPATIENT)
Age: 46
End: 2021-10-11

## 2021-10-14 LAB
ALBUMIN SERPL ELPH-MCNC: 4.5 G/DL
ALP BLD-CCNC: 126 U/L
ALT SERPL-CCNC: 332 U/L
ANION GAP SERPL CALC-SCNC: 12 MMOL/L
AST SERPL-CCNC: 193 U/L
BILIRUB SERPL-MCNC: 0.3 MG/DL
BUN SERPL-MCNC: 8 MG/DL
CALCIUM SERPL-MCNC: 9 MG/DL
CHLORIDE SERPL-SCNC: 103 MMOL/L
CO2 SERPL-SCNC: 28 MMOL/L
CREAT SERPL-MCNC: 0.9 MG/DL
GLUCOSE SERPL-MCNC: 149 MG/DL
POTASSIUM SERPL-SCNC: 4.2 MMOL/L
PROT SERPL-MCNC: 7.1 G/DL
SODIUM SERPL-SCNC: 142 MMOL/L

## 2021-10-16 ENCOUNTER — RX RENEWAL (OUTPATIENT)
Age: 46
End: 2021-10-16

## 2021-11-09 ENCOUNTER — APPOINTMENT (OUTPATIENT)
Dept: PULMONOLOGY | Facility: CLINIC | Age: 46
End: 2021-11-09
Payer: MEDICAID

## 2021-11-09 VITALS
WEIGHT: 185 LBS | BODY MASS INDEX: 23.74 KG/M2 | SYSTOLIC BLOOD PRESSURE: 109 MMHG | TEMPERATURE: 97.1 F | DIASTOLIC BLOOD PRESSURE: 70 MMHG | OXYGEN SATURATION: 97 % | HEIGHT: 74 IN | HEART RATE: 69 BPM

## 2021-11-09 DIAGNOSIS — R91.8 OTHER NONSPECIFIC ABNORMAL FINDING OF LUNG FIELD: ICD-10-CM

## 2021-11-09 DIAGNOSIS — Z11.59 ENCOUNTER FOR SCREENING FOR OTHER VIRAL DISEASES: ICD-10-CM

## 2021-11-09 PROCEDURE — 99214 OFFICE O/P EST MOD 30 MIN: CPT

## 2021-11-10 NOTE — ASSESSMENT
[FreeTextEntry1] : Bronchial asthma\par \par The patient took himself off the maintenance inhaler.  The patient is clinically stable.  Patient has no symptoms of postnasal drip nor GERD.  Patient is compliant with the Dupixent twice a month.  Patient is to continue on the current regimen.  Continue on albuterol as needed basis.  PFT next visit.\par \par The groundglass opacities\par \par The patient is scheduled for repeat CT scan of the chest

## 2021-11-10 NOTE — HISTORY OF PRESENT ILLNESS
[Never] : never [TextBox_4] : he stopped taking the inhaler 2 month ago and he did not notice any difference.  The breathing is about the same and is fine.  he is taking the Dupixent on regulars basis every two weeks.  he is nit sob unless he exerts himself but he is exercising and doing half hour.  he is still using the Flonase.  He stopped using the Afrin.  He is still using the omeprazole.

## 2021-11-11 ENCOUNTER — APPOINTMENT (OUTPATIENT)
Dept: PULMONOLOGY | Facility: CLINIC | Age: 46
End: 2021-11-11

## 2022-02-22 ENCOUNTER — LABORATORY RESULT (OUTPATIENT)
Age: 47
End: 2022-02-22

## 2022-02-22 ENCOUNTER — NON-APPOINTMENT (OUTPATIENT)
Age: 47
End: 2022-02-22

## 2022-02-22 ENCOUNTER — APPOINTMENT (OUTPATIENT)
Dept: INTERNAL MEDICINE | Facility: CLINIC | Age: 47
End: 2022-02-22
Payer: MEDICAID

## 2022-02-22 VITALS
OXYGEN SATURATION: 98 % | SYSTOLIC BLOOD PRESSURE: 154 MMHG | DIASTOLIC BLOOD PRESSURE: 91 MMHG | HEART RATE: 84 BPM | TEMPERATURE: 97 F | BODY MASS INDEX: 24.38 KG/M2 | WEIGHT: 190 LBS | HEIGHT: 74 IN

## 2022-02-22 DIAGNOSIS — F32.2 MAJOR DEPRESSIVE DISORDER, SINGLE EPISODE, SEVERE W/OUT PSYCHOTIC FEATURES: ICD-10-CM

## 2022-02-22 DIAGNOSIS — K76.0 FATTY (CHANGE OF) LIVER, NOT ELSEWHERE CLASSIFIED: ICD-10-CM

## 2022-02-22 DIAGNOSIS — J45.50 SEVERE PERSISTENT ASTHMA, UNCOMPLICATED: ICD-10-CM

## 2022-02-22 DIAGNOSIS — K86.1 OTHER CHRONIC PANCREATITIS: ICD-10-CM

## 2022-02-22 DIAGNOSIS — R74.01 ELEVATION OF LEVELS OF LIVER TRANSAMINASE LEVELS: ICD-10-CM

## 2022-02-22 DIAGNOSIS — R61 GENERALIZED HYPERHIDROSIS: ICD-10-CM

## 2022-02-22 DIAGNOSIS — M62.81 MUSCLE WEAKNESS (GENERALIZED): ICD-10-CM

## 2022-02-22 DIAGNOSIS — R55 SYNCOPE AND COLLAPSE: ICD-10-CM

## 2022-02-22 PROCEDURE — 93000 ELECTROCARDIOGRAM COMPLETE: CPT

## 2022-02-22 PROCEDURE — 99214 OFFICE O/P EST MOD 30 MIN: CPT | Mod: 25

## 2022-02-22 PROCEDURE — 36415 COLL VENOUS BLD VENIPUNCTURE: CPT

## 2022-02-22 RX ORDER — SERTRALINE HYDROCHLORIDE 100 MG/1
100 TABLET, FILM COATED ORAL DAILY
Refills: 0 | Status: ACTIVE | COMMUNITY
Start: 2022-02-22

## 2022-02-22 RX ORDER — SUCRALFATE 1 G/1
1 TABLET ORAL 4 TIMES DAILY
Qty: 120 | Refills: 3 | Status: COMPLETED | COMMUNITY
Start: 2021-10-08 | End: 2022-02-22

## 2022-04-11 PROBLEM — Z11.59 SCREENING FOR VIRAL DISEASE: Status: ACTIVE | Noted: 2021-11-09

## 2022-05-10 ENCOUNTER — APPOINTMENT (OUTPATIENT)
Dept: PULMONOLOGY | Facility: CLINIC | Age: 47
End: 2022-05-10

## 2022-07-14 LAB
ALBUMIN SERPL ELPH-MCNC: 5.1 G/DL
ALP BLD-CCNC: 102 U/L
ALT SERPL-CCNC: 28 U/L
AMYLASE/CREAT SERPL: 61 U/L
ANION GAP SERPL CALC-SCNC: 16 MMOL/L
AST SERPL-CCNC: 25 U/L
BASOPHILS # BLD AUTO: 0.08 K/UL
BASOPHILS NFR BLD AUTO: 1.1 %
BILIRUB SERPL-MCNC: 0.5 MG/DL
BUN SERPL-MCNC: 12 MG/DL
CALCIUM SERPL-MCNC: 9.6 MG/DL
CHLORIDE SERPL-SCNC: 104 MMOL/L
CHOLEST SERPL-MCNC: 206 MG/DL
CK SERPL-CCNC: 70 U/L
CO2 SERPL-SCNC: 22 MMOL/L
CREAT SERPL-MCNC: 0.87 MG/DL
CRP SERPL-MCNC: <3 MG/L
EOSINOPHIL # BLD AUTO: 1.48 K/UL
EOSINOPHIL NFR BLD AUTO: 20.1 %
ERYTHROCYTE [SEDIMENTATION RATE] IN BLOOD BY WESTERGREN METHOD: 20 MM/HR
ESTIMATED AVERAGE GLUCOSE: 105 MG/DL
FOLATE SERPL-MCNC: >20 NG/ML
GLUCOSE SERPL-MCNC: 128 MG/DL
HBA1C MFR BLD HPLC: 5.3 %
HCT VFR BLD CALC: 43.7 %
HDLC SERPL-MCNC: 39 MG/DL
HGB BLD-MCNC: 13.9 G/DL
IMM GRANULOCYTES NFR BLD AUTO: 0.5 %
LDLC SERPL CALC-MCNC: NORMAL MG/DL
LPL SERPL-CCNC: 12 U/L
LYMPHOCYTES # BLD AUTO: 1.49 K/UL
LYMPHOCYTES NFR BLD AUTO: 20.2 %
MAGNESIUM SERPL-MCNC: 2 MG/DL
MAN DIFF?: NORMAL
MCHC RBC-ENTMCNC: 30.9 PG
MCHC RBC-ENTMCNC: 31.8 GM/DL
MCV RBC AUTO: 97.1 FL
MONOCYTES # BLD AUTO: 0.81 K/UL
MONOCYTES NFR BLD AUTO: 11 %
NEUTROPHILS # BLD AUTO: 3.47 K/UL
NEUTROPHILS NFR BLD AUTO: 47.1 %
NONHDLC SERPL-MCNC: 167 MG/DL
PHOSPHATE SERPL-MCNC: 3 MG/DL
PLATELET # BLD AUTO: 152 K/UL
POTASSIUM SERPL-SCNC: 4.4 MMOL/L
PROT SERPL-MCNC: 7.9 G/DL
RBC # BLD: 4.5 M/UL
RBC # FLD: 14.1 %
SODIUM SERPL-SCNC: 142 MMOL/L
TRIGL SERPL-MCNC: 547 MG/DL
TSH SERPL-ACNC: 1.61 UIU/ML
VIT B12 SERPL-MCNC: 603 PG/ML
WBC # FLD AUTO: 7.37 K/UL

## 2022-09-21 ENCOUNTER — RX RENEWAL (OUTPATIENT)
Age: 47
End: 2022-09-21

## 2022-09-21 RX ORDER — DUPILUMAB 300 MG/2ML
300 INJECTION, SOLUTION SUBCUTANEOUS
Qty: 4 | Refills: 5 | Status: ACTIVE | COMMUNITY
Start: 2019-11-09 | End: 1900-01-01

## 2022-10-07 ENCOUNTER — RX RENEWAL (OUTPATIENT)
Age: 47
End: 2022-10-07

## 2022-12-06 DIAGNOSIS — Z20.828 CONTACT WITH AND (SUSPECTED) EXPOSURE TO OTHER VIRAL COMMUNICABLE DISEASES: ICD-10-CM

## 2022-12-06 RX ORDER — OSELTAMIVIR PHOSPHATE 75 MG/1
75 CAPSULE ORAL
Qty: 1 | Refills: 0 | Status: ACTIVE | COMMUNITY
Start: 2022-12-06 | End: 1900-01-01

## 2022-12-06 RX ORDER — BALOXAVIR MARBOXIL 80 MG/1
1 X 80 TABLET, FILM COATED ORAL
Qty: 1 | Refills: 0 | Status: ACTIVE | COMMUNITY
Start: 2022-12-06 | End: 1900-01-01

## 2023-01-09 ENCOUNTER — RX RENEWAL (OUTPATIENT)
Age: 48
End: 2023-01-09

## 2023-01-09 RX ORDER — OMEPRAZOLE 40 MG/1
40 CAPSULE, DELAYED RELEASE ORAL
Qty: 180 | Refills: 0 | Status: ACTIVE | COMMUNITY
Start: 2020-05-13 | End: 1900-01-01

## 2023-12-31 PROBLEM — Z23 NEED FOR 23-POLYVALENT PNEUMOCOCCAL POLYSACCHARIDE VACCINE: Status: ACTIVE | Noted: 2019-10-01
